# Patient Record
Sex: FEMALE
[De-identification: names, ages, dates, MRNs, and addresses within clinical notes are randomized per-mention and may not be internally consistent; named-entity substitution may affect disease eponyms.]

---

## 2022-05-20 ENCOUNTER — NON-APPOINTMENT (OUTPATIENT)
Age: 37
End: 2022-05-20

## 2022-06-24 PROBLEM — Z00.00 ENCOUNTER FOR PREVENTIVE HEALTH EXAMINATION: Status: ACTIVE | Noted: 2022-06-24

## 2022-07-06 ENCOUNTER — APPOINTMENT (OUTPATIENT)
Dept: SURGERY | Facility: CLINIC | Age: 37
End: 2022-07-06

## 2022-07-06 VITALS
DIASTOLIC BLOOD PRESSURE: 82 MMHG | HEART RATE: 75 BPM | OXYGEN SATURATION: 98 % | TEMPERATURE: 97 F | BODY MASS INDEX: 41.61 KG/M2 | WEIGHT: 262 LBS | HEIGHT: 66.5 IN | SYSTOLIC BLOOD PRESSURE: 126 MMHG

## 2022-07-06 DIAGNOSIS — E66.9 OBESITY, UNSPECIFIED: ICD-10-CM

## 2022-07-06 DIAGNOSIS — I10 ESSENTIAL (PRIMARY) HYPERTENSION: ICD-10-CM

## 2022-07-06 DIAGNOSIS — Z78.9 OTHER SPECIFIED HEALTH STATUS: ICD-10-CM

## 2022-07-06 DIAGNOSIS — Z87.891 PERSONAL HISTORY OF NICOTINE DEPENDENCE: ICD-10-CM

## 2022-07-06 PROCEDURE — 99205 OFFICE O/P NEW HI 60 MIN: CPT

## 2022-07-06 RX ORDER — PHENTERMINE HYDROCHLORIDE 15 MG/1
15 CAPSULE ORAL
Qty: 30 | Refills: 0 | Status: COMPLETED | COMMUNITY
Start: 2022-04-25

## 2022-07-06 RX ORDER — OSELTAMIVIR PHOSPHATE 75 MG/1
75 CAPSULE ORAL
Qty: 10 | Refills: 0 | Status: COMPLETED | COMMUNITY
Start: 2022-05-21

## 2022-07-06 RX ORDER — PHENDIMETRAZINE TARTRATE 35 MG/1
35 TABLET ORAL
Qty: 60 | Refills: 0 | Status: COMPLETED | COMMUNITY
Start: 2022-05-09

## 2022-07-06 RX ORDER — HYDROCHLOROTHIAZIDE 25 MG/1
25 TABLET ORAL
Qty: 30 | Refills: 0 | Status: ACTIVE | COMMUNITY
Start: 2022-04-25

## 2022-07-06 RX ORDER — NAPROXEN 500 MG/1
500 TABLET ORAL
Qty: 20 | Refills: 0 | Status: COMPLETED | COMMUNITY
Start: 2022-05-09

## 2022-07-06 RX ORDER — METHOCARBAMOL 500 MG/1
500 TABLET, FILM COATED ORAL
Qty: 10 | Refills: 0 | Status: COMPLETED | COMMUNITY
Start: 2022-05-09

## 2022-07-06 NOTE — HISTORY OF PRESENT ILLNESS
[de-identified] : 36yo female with PMHx of HTN, hyperthyroidism, and class III obesity BMI 41.6 presenting for consultation of weight loss surgery. Patient states she has struggled with her weight for her entire life. Previous weight loss attempts include various diet and exercise regimens with limited success. She has taken Phentermine in the past off and on with only temporary weight loss. She also noted palpitations while she was taking this medication. She was recently started on HCTZ for blood pressure, which prompted her to seek out evaluation for surgery. At this time, patient denies cardiac or respiratory symptoms. She has occasional episodes of heart burn, about once a month, that is self-limited. She has never had an EGD or colonoscopy. Regarding hyperthyroidism, she was diagnosed years ago and took medications. Most recently, it was noted to have resolved and she is no longer on medications for her thyroid.\par

## 2022-07-06 NOTE — PHYSICAL EXAM
[Obese, well nourished, in no acute distress] : obese, well nourished, in no acute distress [Normal] : affect appropriate [de-identified] : pfannenstiel incision [de-identified] : no CVA tenderness B/L

## 2022-07-06 NOTE — ASSESSMENT
[FreeTextEntry1] : 36yo female with PMHx of HTN, hyperthyroidism, and class III obesity BMI 41.6 presenting for consultation of weight loss surgery.\par -discussed surgical options - gastric band, sleeve gastrectomy, ramonita-en-Y gastric bypass\par -discussed potential surgical complications for each option - including bleeding, infection, pain, hernia, leak, stricture, and blood clots\par -discussed smoking of any kind (cigarettes, marijuana, e-cigarettes) is prohibited\par -discussed that pregnancy within 2 years is not recommended\par -at this time, the patient is interested in SLEEVE GASTRECTOMY\par -I informed her that there may be findings on EGD that disqualify her from sleeve, particularly horton's esophagus.\par -hyperthyroidism - resolved, f/u labs \par -nutritional assessment\par -pre-operative preparation - will include PCP evaluation, cardiac evaluation, pulmonary evaluation, EGD, labs\par

## 2022-08-09 ENCOUNTER — APPOINTMENT (OUTPATIENT)
Dept: SURGERY | Facility: CLINIC | Age: 37
End: 2022-08-09

## 2022-08-09 VITALS — WEIGHT: 264 LBS | BODY MASS INDEX: 41.97 KG/M2

## 2022-08-09 PROCEDURE — ZZZZZ: CPT

## 2022-08-10 ENCOUNTER — NON-APPOINTMENT (OUTPATIENT)
Age: 37
End: 2022-08-10

## 2022-09-13 ENCOUNTER — APPOINTMENT (OUTPATIENT)
Dept: SURGERY | Facility: CLINIC | Age: 37
End: 2022-09-13